# Patient Record
Sex: MALE | Race: WHITE
[De-identification: names, ages, dates, MRNs, and addresses within clinical notes are randomized per-mention and may not be internally consistent; named-entity substitution may affect disease eponyms.]

---

## 2019-01-14 ENCOUNTER — HOSPITAL ENCOUNTER (OUTPATIENT)
Dept: HOSPITAL 88 - RAD | Age: 79
End: 2019-01-14
Attending: FAMILY MEDICINE
Payer: MEDICARE

## 2019-01-14 DIAGNOSIS — M16.12: Primary | ICD-10-CM

## 2019-01-14 NOTE — DIAGNOSTIC IMAGING REPORT
Exam:  Left hip series, 2 views.



History: Osteoarthritis left hip. 



Comparison: None.



Findings: 

Left hip radiographs demonstrate moderate to severe degenerative changes with

joint space narrowing, bony osteophyte formation, and subchondral sclerosis. No

evidence of acute fracture, malalignment, or soft tissue abnormality.



Impression:

Moderate to severe left hip osteoarthritis.



Signed by: Dr. Carolyn Bethea MD on 1/14/2019 11:58 AM

## 2019-02-15 NOTE — DIAGNOSTIC IMAGING REPORT
Frontal and lateral views of the chest.



HISTORY:  Preop, left hip replacement

COMPARISON:  None available.

     

DISCUSSION:

   

Lungs:  

The lungs are well inflated.   

No evidence of a consolidative pneumonia or pulmonary alveolar edema.



Pleura:  

No pleural effusion or pneumothorax.



Heart and mediastinum:  

The cardiomediastinal silhouette appears unremarkable.



Bones and soft tissues:  

Appear unremarkable.





IMPRESSION: 

No acute radiographic abnormality.



Signed by: Dr. Porter Sanders D.O., M.M.M. on 2/15/2019 3:48 PM

## 2019-02-18 ENCOUNTER — HOSPITAL ENCOUNTER (INPATIENT)
Dept: HOSPITAL 88 - OR | Age: 79
LOS: 1 days | Discharge: HOME HEALTH | DRG: 470 | End: 2019-02-19
Attending: SPECIALIST | Admitting: SPECIALIST
Payer: MEDICARE

## 2019-02-18 VITALS — DIASTOLIC BLOOD PRESSURE: 66 MMHG | SYSTOLIC BLOOD PRESSURE: 128 MMHG

## 2019-02-18 VITALS — SYSTOLIC BLOOD PRESSURE: 122 MMHG | DIASTOLIC BLOOD PRESSURE: 61 MMHG

## 2019-02-18 VITALS — WEIGHT: 209 LBS | HEIGHT: 70 IN | BODY MASS INDEX: 29.92 KG/M2

## 2019-02-18 VITALS — SYSTOLIC BLOOD PRESSURE: 118 MMHG | DIASTOLIC BLOOD PRESSURE: 62 MMHG

## 2019-02-18 DIAGNOSIS — I10: ICD-10-CM

## 2019-02-18 DIAGNOSIS — M16.11: Primary | ICD-10-CM

## 2019-02-18 DIAGNOSIS — N40.0: ICD-10-CM

## 2019-02-18 PROCEDURE — 97139 UNLISTED THERAPEUTIC PX: CPT

## 2019-02-18 PROCEDURE — 0SR904A REPLACEMENT OF RIGHT HIP JOINT WITH CERAMIC ON POLYETHYLENE SYNTHETIC SUBSTITUTE, UNCEMENTED, OPEN APPROACH: ICD-10-PCS | Performed by: SPECIALIST

## 2019-02-18 PROCEDURE — 86900 BLOOD TYPING SEROLOGIC ABO: CPT

## 2019-02-18 PROCEDURE — 86850 RBC ANTIBODY SCREEN: CPT

## 2019-02-18 PROCEDURE — 71046 X-RAY EXAM CHEST 2 VIEWS: CPT

## 2019-02-18 PROCEDURE — 85014 HEMATOCRIT: CPT

## 2019-02-18 PROCEDURE — 85018 HEMOGLOBIN: CPT

## 2019-02-18 PROCEDURE — 72170 X-RAY EXAM OF PELVIS: CPT

## 2019-02-18 PROCEDURE — 36415 COLL VENOUS BLD VENIPUNCTURE: CPT

## 2019-02-18 PROCEDURE — 86920 COMPATIBILITY TEST SPIN: CPT

## 2019-02-18 RX ADMIN — ACETAMINOPHEN SCH MG: 10 INJECTION, SOLUTION INTRAVENOUS at 16:58

## 2019-02-18 RX ADMIN — ACETAMINOPHEN SCH MG: 10 INJECTION, SOLUTION INTRAVENOUS at 00:10

## 2019-02-18 RX ADMIN — SODIUM CHLORIDE SCH MLS/HR: 9 INJECTION, SOLUTION INTRAVENOUS at 21:31

## 2019-02-18 RX ADMIN — SODIUM CHLORIDE SCH MLS/HR: 9 INJECTION, SOLUTION INTRAVENOUS at 11:31

## 2019-02-18 RX ADMIN — Medication SCH MG: at 17:45

## 2019-02-18 RX ADMIN — CEFAZOLIN SODIUM SCH MLS/HR: 1 SOLUTION INTRAVENOUS at 17:16

## 2019-02-18 NOTE — XMS REPORT
Patient Summary Document

                             Created on: 2019



KEILY CASTRO

External Reference #: [id number]

: 1940

Sex: Male



Demographics







                          Address                   [address] [place], [place] [id number]

 

                          Home Phone                [phone]

 

                          Preferred Language        Unknown

 

                          Marital Status            Unknown

 

                          Gnosticist Affiliation     Unknown

 

                          Race                      Unknown

 

                                        Additional Race(s)  

 

                          Ethnic Group              Unknown





Author







                          Author                    [facility]

 

                          Address                   Unknown

 

                          Phone                     Unavailable







Care Team Providers







                    Care Team Member Name    Role                Phone

 

                    JUAREZ NARANJO     Unavailable         Unavailable

 

                    EV LAZO     Unavailable         Unavailable







Problems

This patient has no known problems.



Allergies, Adverse Reactions, Alerts

This patient has no known allergies or adverse reactions.



Medications

This patient has no known medications.



Results







           Test Description    Test Time    Test Comments    Text Results    Atomic Results    Result

 Comments

 

                CHEST 2 VIEWS    2019-02-15 15:47:00                                                             

                                             [place] [address]  
   Patient Name: KEILY CASTRO                                   MR #: 
[id number]                     : 1940                                  
Age/Sex: 78/M  Acct #: [id number]                              Req #: [id number]
[id number]  Adm Physician:                                                      
Ordered by: JUAREZ NARANJO MD                            Report #: [id number]    
   Location: OR                                      Room/Bed:                  
  
___________________________________________________________________________________________________
   Procedure: [id number] DX/CHEST 2 VIEWS  Exam Date: 02/15/19                   
        Exam Time: 1435                                              REPORT 
STATUS: Signed    Frontal and lateral views of the chest.      HISTORY:  Preop, 
left hip replacement   COMPARISON:  None available.           DISCUSSION:       
 Lungs:     The lungs are well inflated.      No evidence of a consolidative 
pneumonia or pulmonary alveolar edema.      Pleura:     No pleural effusion or 
pneumothorax.      Heart and mediastinum:     The cardiomediastinal silhouette 
appears unremarkable.      Bones and soft tissues:     Appear unremarkable.     
   IMPRESSION:    No acute radiographic abnormality.      Signed by: BRYAN WatsonO., M.M.M. on 2/15/2019 3:48 PM        Dictated By: KAI BARON DO  
Electronically Signed By: KAI BARON DO on 02/15/19 1548  Transcribed By: 
NEO on 02/15/19 1548       COPY TO:   JUAREZ NARANJO MD              

 

                HIP LEFT 2-3 VW (+/- PELVIS)    2019 11:57:00                                              

                                                            [place] [address]      Patient Name: KEILY CASTRO                         
         MR #: [id number]                     : 1940                   
               Age/Sex: 78/M  Acct #: [id number]                              
Req #: [id number]  Adm Physician:                                               
      Ordered by: ALEENA LAZO MD                            Report #: [id number]
[id number]        Location: [facility]                                     Room/Bed:         
           
___________________________________________________________________________________________________
   Procedure: [id number] DX/HIP LEFT 2-3 VW (+/- PELVIS)  Exam Date:             
               Exam Time:                                               REPORT 
STATUS: Signed    Exam:  Left hip series, 2 views.      History: Osteoarthritis 
left hip.       Comparison: None.      Findings:    Left hip radiographs 
demonstrate moderate to severe degenerative changes with   joint space 
narrowing, bony osteophyte formation, and subchondral sclerosis. No   evidence 
of acute fracture, malalignment, or soft tissue abnormality.      Impression:   
Moderate to severe left hip osteoarthritis.      Signed by: Dr. Serge Bush MD 
on 2019 11:58 AM        Dictated By: SERGE BUSH MD  Electronically Signed 
By: SERGE BUSH MD on 19 1158  Transcribed By: NEO on 19 1158    
  COPY TO:   ALEENA LAZO MD

## 2019-02-18 NOTE — DIAGNOSTIC IMAGING REPORT
PELVIS - 2 Images    



HISTORY:  Postop, left hip    

COMPARISON: Left hip radiographs January 14, 2019

     

FINDINGS:

Sensitivity limited by portable technique.

Soft tissue attenuation partially limits sensitivity of the exam.



Bones:

No acute displaced fracture.  

No aggressive osseous lesion.



Joints:

Status post total left hip arthroplasty.

Moderate degenerative changes of the right hip.



Soft tissues:

Regional soft tissue air foci and multiple metallic skin staples, compatible

with recent surgery.





IMPRESSION: 

1.  Status post total left hip arthroplasty, no evidence of immediate

hardware-related complication.

2.  Moderate right hip osteoarthrosis.



Signed by: Dr. Porter Sanders D.O., M.M.M. on 2/18/2019 12:08 PM

## 2019-02-18 NOTE — NUR
Patient admitted to unit from PACU. Patient is post op left hip replacement. Dressing clean 
and dry to hip. No drainage noted. Lung fields clear to auscultation. Bowel sounds present 
x4 and active. No edema noted. Bilateral SCD's in place. Right hand IV in place. IV fluids 
infusing. Patient had a spinal block and has no c/o pain. Patient is able to feel the touch 
on his toes. Pedal pulses palpable. No s/s of distress noted

## 2019-02-19 VITALS — SYSTOLIC BLOOD PRESSURE: 122 MMHG | DIASTOLIC BLOOD PRESSURE: 65 MMHG

## 2019-02-19 VITALS — SYSTOLIC BLOOD PRESSURE: 114 MMHG | DIASTOLIC BLOOD PRESSURE: 59 MMHG

## 2019-02-19 VITALS — DIASTOLIC BLOOD PRESSURE: 65 MMHG | SYSTOLIC BLOOD PRESSURE: 122 MMHG

## 2019-02-19 VITALS — DIASTOLIC BLOOD PRESSURE: 56 MMHG | SYSTOLIC BLOOD PRESSURE: 110 MMHG

## 2019-02-19 LAB
HCT VFR BLD AUTO: 35.9 % (ref 38.2–49.6)
HGB BLD-MCNC: 12.3 G/DL (ref 14–18)

## 2019-02-19 RX ADMIN — ACETAMINOPHEN SCH MG: 10 INJECTION, SOLUTION INTRAVENOUS at 00:10

## 2019-02-19 RX ADMIN — Medication SCH MG: at 08:11

## 2019-02-19 RX ADMIN — CEFAZOLIN SODIUM SCH MLS/HR: 1 SOLUTION INTRAVENOUS at 09:20

## 2019-02-19 RX ADMIN — CEFAZOLIN SODIUM SCH MLS/HR: 1 SOLUTION INTRAVENOUS at 02:15

## 2019-02-19 RX ADMIN — Medication SCH MG: at 11:24

## 2019-02-19 RX ADMIN — SODIUM CHLORIDE SCH MLS/HR: 9 INJECTION, SOLUTION INTRAVENOUS at 07:31

## 2019-02-19 RX ADMIN — ACETAMINOPHEN SCH MG: 10 INJECTION, SOLUTION INTRAVENOUS at 06:00

## 2019-02-19 NOTE — CONSULTATION
DATE OF CONSULTATION:    



Patient is here status post left hip arthroplasty.  Consulted for medical 

management. 



PAST MEDICAL HISTORY:  History of hypertension, history of hyperlipidemia, 

history of esophagitis and also history of BPH.



MEDICINES:  He takes at home are:

1.  Finasteride 5 mg daily.

2.  Gabapentin 100 mg 3 times a day.

3.  Losartan 50 mg daily.

4.  Tamsulosin.



ALLERGIES:  NO KNOWN DRUG ALLERGIES.  



SURGICAL HISTORY:  Includes history of appendectomy and other illnesses 

include osteoarthritis and chronic allergies.



REVIEW OF SYSTEMS:  At this time, the patient is pain controlled after 

surgery with just Tylenol.  No chest pain.  No shortness of breath.  No 

nausea, vomiting, diarrhea.  No constipation.  No rectal bleeding, 

hematochezia and no hematemesis.



PHYSICAL EXAMINATION 

GENERAL:  The patient is alert and oriented times 3.  

HEENT:  Normocephalic and atraumatic.  Pupils reactive to light and 

accommodation.  

CV:  S1 and S2 normal.  Regular rate and rhythm.  

ABDOMEN:  Nontender and nondistended.

EXTREMITIES:  No clubbing.  No cyanosis.  No edema.  Left hip bandage clean 

and dry.  No seepage noted.  



ASSESSMENT 

1.  Unilateral primary osteoarthritis of left hip:  Status post left hip 

arthroplasty.

2.  History of hypertension:  Will restart his losartan.

3.  History of BPH:  Will restart his finasteride and his tamsulosin and 

terazosin.



Further recommendations per clinical course.  The patient is very stable.  

Further disposition on discharge depending on Dr. Guerin.  At this time, 

medically stable.  Continue to monitor his H and H.  Normal postsurgical 

recommendations to continue. 

 



 





DD:  02/19/2019 06:22

DT:  02/19/2019 06:28

Job#:  [id number]

## 2019-02-19 NOTE — NUR
Patient is post op day 1 left hip replacement. Dressing to left hip clean, dry, intact. No 
c/o pain at this time. Lung fields clear to auscultation. Bowel sounds present x4. Patient 
passing gas. No edema noted. Right hand 20G IV in place. Patient ambulating with a walker 
and assistance.

## 2019-02-19 NOTE — NUR
Patient discharged from facility to home. Patient assisted out via staff in wheelchair, 
Reviewed all discharge paperwork, follow up appts and RX's given

## 2019-02-19 NOTE — NUR
CASE MANAGEMENT ASSESSMENT 

 to bedside to discuss plan of care with patient/family. CM/SW role and care 
transitions discussed. Anticipated discharge plan discussed along with duration of care. 
CM/SW discussed patients right to make decisions in care.  CM/SW work hours given.  

Patient lives: with wife Keyla

Admit/Transfer: from PACU

Hospital/ER visits since last admit: last hospitalization in 1946

POA/Emergency contact: Keyla Perez [phone]

Current/Previous Home Health: none

PCP/Follow-up Care: Dr. Yates - PCP; Dr. Guerin - ortho - pt to follow up next Thursday; 
stated he will call to get an appointment time

Current/Previous DME: none



Medications (referring to index hospitalization or the first time you were in the 
hospital)

a. Were changes made in your medications when you were in the hospital on [index 
hospitalization]?  n/a

b. Did you understand the changes? n/a

c. Were you able to obtain your new medications right away? n/a

d. Were you able to take your medications like the doctor wanted you to? n/a

e. Did the hospital give you an accurate, easy to understand list of medications when you 
left? n/a



Scale of 1-10 how comfortable does patient feel with disease management in outpatient 
setting: 10

Other Services: none

Employment Status: retired 

Areas of Concerns: left hip surgery

Referral Needs: home health, DME

DC plans pre-arranged by Dr. Guerin's office as follows. Choice letter was signed and 
placed in chart. Copy to pt.

Home Health with [facility] [phone]. CM called and spoke to Tabatha and 
she stated that pt will be seen tomorrow.

DME with [facility]. Spoke to Monika. Pt is on schedule to be delivered today. 
Stated she can have DME delivered to hospital in a few hours. 

Education Needs: post operative instructions

IMM/MOON given and signed (if applicable): IMM was delivered and explained. He verbalized 
understanding. Signed copy in chart. Copy to pt.

Goal for discharge: home with home health  

CM/SW left business card at the bedside with contact information. Name and number was also 
written on the patients whiteboard. Patient verbalized understanding of discussion. CM will 
follow-up with ongoing discharge and transition of care needs.

## 2019-02-20 NOTE — OPERATIVE REPORT
DATE OF PROCEDURE:  02/18/2019

 

ASSISTANT:  Angelo Graf PA-C.

 

PREOPERATIVE DIAGNOSIS:  Osteoarthritis, left hip.

 

POSTOPERATIVE DIAGNOSIS:  Osteoarthritis, left hip.

 

PROCEDURE:  Left total hip arthroplasty.

 

INDICATIONS:  The patient is a 78-year-old gentleman, who has advanced osteoarthritis of

his left hip.  He has failed conservative management and would like to proceed with

definitive intervention.  The risks and benefits of a left total hip replacement were

explained.  The implants, hospital stay, and recovery were discussed.  He states he

understands and wishes to proceed. 

 

DESCRIPTION OF PROCEDURE:  The patient was brought to the operating room and given a

spinal anesthetic.  He received prophylactic antibiotics and tranexamic acid.  He was

positioned in the right lateral decubitus position.  His left hip was prepped and draped

in the sterile manner.  A limited incision posterior approach was made to the left hip.

Hemostasis was obtained with electrocautery.  A Charnley self-retaining retractor was

placed.  Care was taken to avoid injury to the sciatic nerve.  The posterior capsule was

carefully exposed and released.  The hip was dislocated.  An oscillating saw was used to

resect the femoral head.  Complete loss of articular cartilage was noted.  Acetabular

retractors were carefully placed.  Additional soft tissue releases were necessary to

obtain appropriate visualization of the socket.  Labral remnants were excised with a

long handle surgical knife.  The true floor of the acetabulum was established with a

46-mm reamer.  The socket was then sequentially reamed to 55 mm.  This accomplished

bleeding hemispherical cancellous bone.  A Zackery [employer] 56-mm __________ socket was

then impacted into place.  Its fixation was augmented with a single 25-mm screw, placed

into the ilium.  A highly crosslinked neutral polyethylene liner with a 36-mm inner

diameter was then seated.  The socket had been thoroughly irrigated on multiple

occasions with a shower tip pulsatile lavage during this portion of the case.  A spray

bottle with polymixin and vancomycin was also used on several occasions.  The socket was

packed with a moistly soaked lap sponge and attention was directed towards the proximal

femur.  A box-cutting osteotome and taper pin reamer were used to establish entry to the

femoral canal.  Taperloc broaches were impacted.  A size 14 stem provided appropriate

stability for trial reductions.  A standard 36-mm head provided appropriate soft tissue

balancing and stability to a full arc of motion.  The trial implants were removed.  A

100 mL premixed pericapsular FERNANDO injection was placed into the surrounding soft tissue.

 The implants were seated and a final reduction was performed.  Once again, the hip was

put through a full arc of motion and noted to have good stability.  The posterior

capsule was carefully repaired with interrupted #2 Ethibond.  The gluteal fascia was

closed with interrupted #2 Ethibond.  The skin was closed with subcuticular Vicryl and

staples.  A sterile bandage was applied.  The patient was returned to the supine

position and transported to the recovery room in stable condition.  Estimated blood loss

was 75 mL.  All needle and sponge counts were correct. 

 

 

 

 

______________________________

Alex Guerin MD DR/RIKA

D:  02/19/2019 17:43:31

T:  02/20/2019 00:47:47

Job #:  [id number]

## 2023-05-10 LAB
BASOPHILS # BLD AUTO: 0.1 10*3/UL (ref 0–0.1)
BASOPHILS NFR BLD AUTO: 0.9 % (ref 0–1)
DEPRECATED NEUTROPHILS # BLD AUTO: 2.2 10*3/UL (ref 2.1–6.9)
EOSINOPHIL # BLD AUTO: 0.2 10*3/UL (ref 0–0.4)
EOSINOPHIL NFR BLD AUTO: 2.9 % (ref 0–6)
ERYTHROCYTE [DISTWIDTH] IN CORD BLOOD: 13.3 % (ref 11.7–14.4)
HCT VFR BLD AUTO: 39.5 % (ref 38.2–49.6)
HGB BLD-MCNC: 13.5 G/DL (ref 14–18)
LYMPHOCYTES # BLD: 2.7 10*3/UL (ref 1–3.2)
LYMPHOCYTES NFR BLD AUTO: 49.3 % (ref 18–39.1)
MCH RBC QN AUTO: 31.6 PG (ref 28–32)
MCHC RBC AUTO-ENTMCNC: 34.2 G/DL (ref 31–35)
MCV RBC AUTO: 92.5 FL (ref 81–99)
MONOCYTES # BLD AUTO: 0.4 10*3/UL (ref 0.2–0.8)
MONOCYTES NFR BLD AUTO: 6.4 % (ref 4.4–11.3)
NEUTS SEG NFR BLD AUTO: 40.1 % (ref 38.7–80)
PLATELET # BLD AUTO: 138 X10E3/UL (ref 140–360)
RBC # BLD AUTO: 4.27 X10E6/UL (ref 4.3–5.7)

## 2023-05-16 ENCOUNTER — HOSPITAL ENCOUNTER (OUTPATIENT)
Dept: HOSPITAL 88 - OR | Age: 83
Discharge: HOME | End: 2023-05-16
Attending: OPHTHALMOLOGY
Payer: MEDICARE

## 2023-05-16 VITALS
HEART RATE: 50 BPM | DIASTOLIC BLOOD PRESSURE: 66 MMHG | RESPIRATION RATE: 14 BRPM | SYSTOLIC BLOOD PRESSURE: 131 MMHG | OXYGEN SATURATION: 100 %

## 2023-05-16 DIAGNOSIS — Z01.812: ICD-10-CM

## 2023-05-16 DIAGNOSIS — Z79.899: ICD-10-CM

## 2023-05-16 DIAGNOSIS — I10: ICD-10-CM

## 2023-05-16 DIAGNOSIS — H25.11: Primary | ICD-10-CM

## 2023-05-16 DIAGNOSIS — E78.5: ICD-10-CM

## 2023-05-16 DIAGNOSIS — Z79.82: ICD-10-CM

## 2023-05-16 PROCEDURE — 85025 COMPLETE CBC W/AUTO DIFF WBC: CPT

## 2023-05-16 PROCEDURE — 36415 COLL VENOUS BLD VENIPUNCTURE: CPT

## 2023-05-16 PROCEDURE — 66984 XCAPSL CTRC RMVL W/O ECP: CPT

## 2023-06-06 ENCOUNTER — HOSPITAL ENCOUNTER (OUTPATIENT)
Dept: HOSPITAL 88 - OR | Age: 83
Discharge: HOME | End: 2023-06-06
Attending: OPHTHALMOLOGY
Payer: MEDICARE

## 2023-06-06 VITALS
SYSTOLIC BLOOD PRESSURE: 132 MMHG | HEART RATE: 47 BPM | OXYGEN SATURATION: 98 % | RESPIRATION RATE: 16 BRPM | DIASTOLIC BLOOD PRESSURE: 66 MMHG

## 2023-06-06 VITALS — TEMPERATURE: 97.3 F

## 2023-06-06 DIAGNOSIS — E78.5: ICD-10-CM

## 2023-06-06 DIAGNOSIS — M19.90: ICD-10-CM

## 2023-06-06 DIAGNOSIS — I12.9: ICD-10-CM

## 2023-06-06 DIAGNOSIS — H25.12: Primary | ICD-10-CM

## 2023-06-06 DIAGNOSIS — Z79.82: ICD-10-CM

## 2023-06-06 DIAGNOSIS — N18.9: ICD-10-CM

## 2023-06-06 DIAGNOSIS — Z79.899: ICD-10-CM

## 2023-06-06 PROCEDURE — 66984 XCAPSL CTRC RMVL W/O ECP: CPT
